# Patient Record
Sex: FEMALE | Race: WHITE | NOT HISPANIC OR LATINO | Employment: FULL TIME | URBAN - METROPOLITAN AREA
[De-identification: names, ages, dates, MRNs, and addresses within clinical notes are randomized per-mention and may not be internally consistent; named-entity substitution may affect disease eponyms.]

---

## 2022-03-14 ENCOUNTER — TELEPHONE (OUTPATIENT)
Dept: OBGYN CLINIC | Facility: HOSPITAL | Age: 52
End: 2022-03-14

## 2022-05-04 NOTE — PROGRESS NOTES
Assessment and Plan:   Ms Pattie Mojica is a 43-year-old female with no significant past medical history who presents for an evaluation of a positive JENN (no titer or pattern reported), an RNP antibody of 1 4, skin rash and myalgias  She is self-referred today  Anil Zeng presents today for an evaluation of a positive JENN and borderline positive RNP antibody but without prominent symptoms to suggest an underlying connective tissue disease  Her main concerns are widespread body aches which appears to be more representative of a myofascial pain syndrome although she does not have myofascial tenderness noted on her examination today  This sounds less suggestive of an inflammatory arthritis and I advised her that she can continue to manage this with a conservative approach such as with use of Tylenol, NSAIDs as needed or with dietary modification  Additionally she reports a recurrent macular skin rash which she has noticed most prominently on her neck  The appearance of this does not seem suggestive of an autoimmune type skin rash and I advised her if this continues to be a recurrent issue I would recommend she seek an opinion with Allergy/immunology or Dermatology  Otherwise her review of systems is unremarkable and at this time I suspect the JENN and RNP antibody are clinically insignificant  - I will further evaluate the positive JENN and her symptoms by obtaining the testing as listed below  I will contact her with the results and determine if she needs a sooner follow-up  If the workup is unrevealing then I will monitor the JENN and her symptoms on an annual basis  Plan:  Diagnoses and all orders for this visit:    Positive JENN (antinuclear antibody)  -     Anti-Jessica 1 Antibody; Future  -     Centromere Antibody; Future  -     Anti-scleroderma antibody; Future  -     Histone Antibody; Future  -     Nuclear antigen antibody; Future  -     Sedimentation rate, automated;  Future  -     C-reactive protein; Future  -     C3 complement; Future  -     C4 complement; Future  -     Beta-2 glycoprotein antibodies; Future  -     Cardiolipin antibody; Future  -     Lupus anticoagulant; Future  -     Protein / creatinine ratio, urine  -     Ferritin; Future  -     Thyroid Antibodies Panel; Future    Anti-RNP antibodies present    Diffuse arthralgia  -     RF Screen w/ Reflex to Titer; Future  -     Cyclic citrul peptide antibody, IgG; Future  -     Ferritin; Future  -     HLA-B27 antigen; Future    Skin rash  -     Ferritin; Future  -     HLA-B27 antigen; Future    Screening for thyroid disorder  -     TSH, 3rd generation; Future  -     Thyroid Antibodies Panel; Future    Other orders  -     ergocalciferol (ERGOCALCIFEROL) 1 25 MG (47427 UT) capsule; Take 50,000 Units by mouth every 7 days  -     methylPREDNISolone 4 MG tablet therapy pack; Follow package directions (Patient not taking: Reported on 5/5/2022 )  -     vitamin A 3 MG (83800 UT) capsule; Take 10,000 Units by mouth daily      Activities as tolerated  Exercise: try to maintain a low impact exercise regimen as much as possible  Walk for 30 minutes a day for at least 3 days a week  Continue other medications as prescribed by PCP and other specialists  RTC in 1 year  HPI  Ms Candelaria Church is a 60-year-old female with no significant past medical history who presents for an evaluation of a positive JENN (no titer or pattern reported), an RNP antibody of 1 4, skin rash and myalgias  She is self-referred today  Patient reports for more than 10 years she has experienced body aches and stiffness which she describes as a flu-like sensation without localized joint pains or swelling, for which she modified her diet to gluten free  She reports making this change helped significantly with her symptoms but on occasion she will experience what she describes as flare-ups of the body aches but is unsure if this may be related to her diet    She reports the symptoms are manageable but if they become severe she will take ibuprofen which helps  She mentions over the past 2 years she has had various skin rashes which are red and flat to the skin that have occurred on her neck, under her left eye and occasionally on her arms or thighs  This has occurred sporadically over the past few years but since January 2022 she was experiencing the neck rash on a near weekly basis  It has not occurred in the past 3 weeks  She would apply topical ointments including steroids which would not help and she reports the skin rash usually resolves spontaneously within a week  It is itchy  It is not photosensitive  She has been unable to associate this with any dietary triggers or environmental agents such as detergents, lotions, shampoos etc  She has not seen Allergy/immunology or Dermatology for this  She has noticed a nodularity which she thinks may be a lymph node in her right supraclavicular region which is fairly constant  She denies fevers, unintentional weight loss, alopecia, dry eyes, dry mouth, inflammatory eye disease, other types of skin rash, psoriasis (reports that she had this many years ago prior to becoming gluten free), skin thickening/tightening, photosensitivity, mouth/nose ulcers, dysphagia, GERD, pleuritic chest pain, shortness of breath, cough, inflammatory bowel disease, blood clots (reports a history of 1 miscarriage), Raynaud's or muscle weakness  She reports a history of fibromyalgia in her mother and states that in her 76s she was diagnosed with systemic lupus erythematosus  Of note she has seen Rheumatology in the past for the positive JENN and there was apparently no concerns for an autoimmune disease at that time      The following portions of the patient's history were reviewed and updated as appropriate: allergies, current medications, past family history, past medical history, past social history, past surgical history and problem list       Review of Systems  Constitutional: Negative for weight change, fevers, chills, night sweats, fatigue  ENT/Mouth: Negative for hearing changes, ear pain, nasal congestion, sinus pain, hoarseness, sore throat, rhinorrhea, swallowing difficulty  Eyes: Negative for pain, redness, discharge, vision changes  Cardiovascular: Negative for chest pain, SOB, palpitations  Respiratory: Negative for cough, sputum, wheezing, dyspnea  Gastrointestinal: Negative for nausea, vomiting, diarrhea, constipation, pain, heartburn  Genitourinary: Negative for dysuria, urinary frequency, hematuria  Musculoskeletal: As per HPI  Skin: Negative for color changes  Positive for skin rash  Neuro: Negative for weakness, numbness, tingling, loss of consciousness  Psych: Negative for anxiety, depression  Heme/Lymph: Negative for easy bruising, bleeding, lymphadenopathy  History reviewed  No pertinent past medical history        Past Surgical History:   Procedure Laterality Date    SHOULDER SURGERY         Social History     Socioeconomic History    Marital status: /Civil Union     Spouse name: Not on file    Number of children: Not on file    Years of education: Not on file    Highest education level: Not on file   Occupational History    Not on file   Tobacco Use    Smoking status: Never Smoker    Smokeless tobacco: Never Used   Substance and Sexual Activity    Alcohol use: Yes     Comment: social    Drug use: Never    Sexual activity: Not on file   Other Topics Concern    Not on file   Social History Narrative    Not on file     Social Determinants of Health     Financial Resource Strain: Not on file   Food Insecurity: Not on file   Transportation Needs: Not on file   Physical Activity: Not on file   Stress: Not on file   Social Connections: Not on file   Intimate Partner Violence: Not on file   Housing Stability: Not on file       Family History   Problem Relation Age of Onset    Lupus Mother    [facility] Fibromyalgia Mother     Cancer Father     Cancer Maternal Grandmother     Cancer Maternal Grandfather        Allergies   Allergen Reactions    Gluten Meal - Food Allergy Abdominal Pain, Dizziness, Fatigue, GI Intolerance, Headache, Hives, Lightheadedness and Vomiting    Penicillins Rash       Current Outpatient Medications:     vitamin A 3 MG (97734 UT) capsule, Take 10,000 Units by mouth daily, Disp: , Rfl:     ergocalciferol (ERGOCALCIFEROL) 1 25 MG (96896 UT) capsule, Take 50,000 Units by mouth every 7 days, Disp: , Rfl:     methylPREDNISolone 4 MG tablet therapy pack, Follow package directions (Patient not taking: Reported on 5/5/2022 ), Disp: , Rfl:       Objective:    Vitals:    05/05/22 0846   BP: 109/72   Pulse: 71   Temp: 98 4 °F (36 9 °C)   Weight: 65 8 kg (145 lb)       Physical Exam  General: Well appearing, well nourished, in no distress  Oriented x 3, normal mood and affect  Ambulating without difficulty  Skin: Good turgor, no rash on exam today, unusual bruising or prominent lesions  Hair: Normal texture and distribution  Nails: Normal color, no deformities  HEENT:  Head: Normocephalic, atraumatic  Eyes: Conjunctiva clear, sclera non-icteric, EOM intact  Nose: No external lesions, mucosa non-inflamed  Mouth: Mucous membranes moist, no mucosal lesions  Neck: Supple, thyroid non-enlarged and non-tender  No lymphadenopathy, and I think she has prominence of the right sternocleidomastoid muscle  Extremities: No amputations or deformities, cyanosis, edema  Musculoskeletal:   There is no peripheral joint soft tissue swelling or tenderness noted  She has cervical and lumbar myofascial tenderness noted but otherwise negative fibromyalgia tender points  Neurologic: Alert and oriented  No focal neurological deficits appreciated  Psychiatric: Normal mood and affect  NICK Vargas    Rheumatology

## 2022-05-05 ENCOUNTER — OFFICE VISIT (OUTPATIENT)
Dept: RHEUMATOLOGY | Facility: CLINIC | Age: 52
End: 2022-05-05
Payer: COMMERCIAL

## 2022-05-05 VITALS
SYSTOLIC BLOOD PRESSURE: 109 MMHG | TEMPERATURE: 98.4 F | WEIGHT: 145 LBS | DIASTOLIC BLOOD PRESSURE: 72 MMHG | HEART RATE: 71 BPM

## 2022-05-05 DIAGNOSIS — R76.8 POSITIVE ANA (ANTINUCLEAR ANTIBODY): Primary | ICD-10-CM

## 2022-05-05 DIAGNOSIS — Z13.29 SCREENING FOR THYROID DISORDER: ICD-10-CM

## 2022-05-05 DIAGNOSIS — M25.50 DIFFUSE ARTHRALGIA: ICD-10-CM

## 2022-05-05 DIAGNOSIS — R76.8 ANTI-RNP ANTIBODIES PRESENT: ICD-10-CM

## 2022-05-05 DIAGNOSIS — R21 SKIN RASH: ICD-10-CM

## 2022-05-05 PROCEDURE — 99205 OFFICE O/P NEW HI 60 MIN: CPT | Performed by: INTERNAL MEDICINE

## 2022-05-05 RX ORDER — ERGOCALCIFEROL (VITAMIN D2) 1250 MCG
50000 CAPSULE ORAL
COMMUNITY

## 2022-05-05 RX ORDER — METHYLPREDNISOLONE 4 MG/1
TABLET ORAL
COMMUNITY
Start: 2022-04-27

## 2025-03-12 ENCOUNTER — OFFICE VISIT (OUTPATIENT)
Dept: RHEUMATOLOGY | Facility: CLINIC | Age: 55
End: 2025-03-12
Payer: COMMERCIAL

## 2025-03-12 VITALS
WEIGHT: 141 LBS | DIASTOLIC BLOOD PRESSURE: 78 MMHG | HEART RATE: 62 BPM | OXYGEN SATURATION: 95 % | SYSTOLIC BLOOD PRESSURE: 110 MMHG | HEIGHT: 67 IN | BODY MASS INDEX: 22.13 KG/M2

## 2025-03-12 DIAGNOSIS — E04.1 THYROID CYST: Primary | ICD-10-CM

## 2025-03-12 DIAGNOSIS — R77.8 HIGH SERUM THYROGLOBULIN: ICD-10-CM

## 2025-03-12 PROCEDURE — 99213 OFFICE O/P EST LOW 20 MIN: CPT | Performed by: INTERNAL MEDICINE

## 2025-03-12 RX ORDER — ASPIRIN 81 MG/1
81 TABLET ORAL DAILY
COMMUNITY

## 2025-03-12 RX ORDER — AZITHROMYCIN 250 MG/1
TABLET, FILM COATED ORAL
COMMUNITY
Start: 2025-03-05

## 2025-03-12 RX ORDER — OMEGA-3/DHA/EPA/FISH OIL 300-1000MG
2 CAPSULE ORAL DAILY
COMMUNITY

## 2025-03-12 RX ORDER — PERFLUOROHEXYLOCTANE 1 MG/MG
SOLUTION OPHTHALMIC
COMMUNITY
Start: 2024-12-18

## 2025-03-12 RX ORDER — GABAPENTIN 100 MG/1
100 CAPSULE ORAL 2 TIMES DAILY
COMMUNITY

## 2025-03-12 RX ORDER — VALACYCLOVIR HYDROCHLORIDE 1 G/1
1 TABLET, FILM COATED ORAL DAILY
COMMUNITY
Start: 2025-01-30

## 2025-03-12 RX ORDER — TIZANIDINE HYDROCHLORIDE 2 MG/1
2 CAPSULE, GELATIN COATED ORAL
COMMUNITY

## 2025-03-12 NOTE — PROGRESS NOTES
Name: Nicole Abraham      : 1970      MRN: [id number]  Encounter Provider: Karen Rivera MD  Encounter Date: 3/12/2025   Encounter department: [facility] [place]  :  Assessment & Plan  Thyroid cyst  Pt has a new onset of mass on the R side of anterior neck that she noticed 1 week ago, blood work showed very elevated thyroglobulin levels >2000 but normal TSH, T3/4, and no detectable thyroid ab. She also had an ultrasound of thyroid showing benign cyst of the R side of thyroid. She said it is mildly tender on palpation.  Plan:  Recommend referral to Endocrinology for further evaluation and management  No further workup from our end, or need to follow with Rheumatology at this time  Orders:    Ambulatory Referral to Endocrinology; Future    High serum thyroglobulin    Orders:    Ambulatory Referral to Endocrinology; Future        History of Present Illness     Initial visit 2022  HPI  Ms. Abraham is a 52-year-old female with no significant past medical history who presents for an evaluation of a positive JENN (no titer or pattern reported), an RNP antibody of 1.4, skin rash and myalgias.  She is self-referred today.     Patient reports for more than 10 years she has experienced body aches and stiffness which she describes as a flu-like sensation without localized joint pains or swelling, for which she modified her diet to gluten free.  She reports making this change helped significantly with her symptoms but on occasion she will experience what she describes as flare-ups of the body aches but is unsure if this may be related to her diet.  She reports the symptoms are manageable but if they become severe she will take ibuprofen which helps.  She mentions over the past 2 years she has had various skin rashes which are red and flat to the skin that have occurred on her neck, under her left eye and occasionally on her arms or thighs.  This has occurred sporadically over the past few  years but since January 2022 she was experiencing the neck rash on a near weekly basis.  It has not occurred in the past 3 weeks.  She would apply topical ointments including steroids which would not help and she reports the skin rash usually resolves spontaneously within a week.  It is itchy.  It is not photosensitive.  She has been unable to associate this with any dietary triggers or environmental agents such as detergents, lotions, shampoos etc..  She has not seen Allergy/immunology or Dermatology for this.     She has noticed a nodularity which she thinks may be a lymph node in her right supraclavicular region which is fairly constant.  She denies fevers, unintentional weight loss, alopecia, dry eyes, dry mouth, inflammatory eye disease, other types of skin rash, psoriasis (reports that she had this many years ago prior to becoming gluten free), skin thickening/tightening, photosensitivity, mouth/nose ulcers, dysphagia, GERD, pleuritic chest pain, shortness of breath, cough, inflammatory bowel disease, blood clots (reports a history of 1 miscarriage), Raynaud's or muscle weakness.  She reports a history of fibromyalgia in her mother and states that in her 70s she was diagnosed with systemic lupus erythematosus.     Of note she has seen Rheumatology in the past for the positive JENN and there was apparently no concerns for an autoimmune disease at that time.     The following portions of the patient's history were reviewed and updated as appropriate: allergies, current medications, past family history, past medical history, past social history, past surgical history and problem list.    3/12/25  KUNAL Abraham is a 55 y.o. female who presents for evaluation of rashes and myalgias.    She had a positive JENN and RNP antibody but her symptoms did not seem to be consistent with a particular diagnosis. She did have myofascial pain that seemed to be more consistent with fibromyalgia than an inflammatory  "arthritis.    Blood work done since that visit showed + RNP ab 1.4, negative RF, CCP, HLAB27 ag, smith ab, dsdna ab, SSA/SSB, histone ab, centromere ab, scl-70 ab, anti-Jo1 ab, B2GP, lupus anticoagulant, cardiolipin ab, normal C3/C4, normal CRP and ESR    Since her last visit, she has not had any new symptoms of joint pain/swelling.    She is on gabapentin PRN due to having shingles in November 2023, and also had ocular involvement so has been taking Valtrex as well daily.     The only new symptom she has that she is coming today for evaluation of is a mass in her neck. She felt a lump in her neck 1 week ago and lab workup shows elevated thyroglobulin, and thyroid ultrasound showed a benign cyst. She was referred to us by her PCP to see if any autoimmune cause for this.     She has been having chronic back pain, following Spine doctors and was recommended to get surgery but she delayed and decided to do PT and see chiropractor which has been very helpful.             Review of Systems   Constitutional: Negative.    HENT:          Mass in the neck   Eyes: Negative.    Respiratory: Negative.     Cardiovascular: Negative.    Gastrointestinal: Negative.    Genitourinary: Negative.    Musculoskeletal: Negative.    Skin: Negative.           Objective   /78   Pulse 62   Ht 5' 7\" (1.702 m)   Wt 64 kg (141 lb)   SpO2 95%   BMI 22.08 kg/m²      Physical Exam  Constitutional:       Appearance: Normal appearance.   HENT:      Head: Normocephalic.   Eyes:      Extraocular Movements: Extraocular movements intact.      Pupils: Pupils are equal, round, and reactive to light.   Neck:      Comments: Right side of anterior neck has a mass, mildly tender to touch  Cardiovascular:      Pulses: Normal pulses.      Heart sounds: Normal heart sounds.   Pulmonary:      Effort: Pulmonary effort is normal.      Breath sounds: Normal breath sounds.   Musculoskeletal:      Comments: MSK Exam  The following areas have been examined " today and do not show any signs of synovitis, tenderness, or restricted ROM unless otherwise specified below:  Neck  Shoulders  Back  Elbows  Wrists  Hands  Hips  Knees  Ankles  Feet    Neurological:      Mental Status: She is alert and oriented to person, place, and time.

## 2025-03-12 NOTE — ASSESSMENT & PLAN NOTE
Pt has a new onset of mass on the R side of anterior neck that she noticed 1 week ago, blood work showed very elevated thyroglobulin levels >2000 but normal TSH, T3/4, and no detectable thyroid ab. She also had an ultrasound of thyroid showing benign cyst of the R side of thyroid. She said it is mildly tender on palpation.  Plan:  Recommend referral to Endocrinology for further evaluation and management  No further workup from our end, or need to follow with Rheumatology at this time  Orders:    Ambulatory Referral to Endocrinology; Future

## 2025-04-30 ENCOUNTER — CONSULT (OUTPATIENT)
Dept: ENDOCRINOLOGY | Facility: CLINIC | Age: 55
End: 2025-04-30
Payer: COMMERCIAL

## 2025-04-30 VITALS
HEART RATE: 66 BPM | DIASTOLIC BLOOD PRESSURE: 78 MMHG | OXYGEN SATURATION: 97 % | WEIGHT: 140 LBS | BODY MASS INDEX: 21.97 KG/M2 | SYSTOLIC BLOOD PRESSURE: 108 MMHG | TEMPERATURE: 98.3 F | HEIGHT: 67 IN | RESPIRATION RATE: 12 BRPM

## 2025-04-30 DIAGNOSIS — E83.52 HYPERCALCEMIA: ICD-10-CM

## 2025-04-30 DIAGNOSIS — R77.8 HIGH SERUM THYROGLOBULIN: Primary | ICD-10-CM

## 2025-04-30 DIAGNOSIS — E04.1 THYROID CYST: ICD-10-CM

## 2025-04-30 PROCEDURE — 99204 OFFICE O/P NEW MOD 45 MIN: CPT | Performed by: INTERNAL MEDICINE

## 2025-04-30 NOTE — ASSESSMENT & PLAN NOTE
She has a Rt midpole 1.8c, X 2.3 X 2.4cm mostly cystic lesion identified on US at an outside facility.    Today we discussed all aspects of thyroid nodules/cysts including prevalence, usual risk factors, pathophysiology, complications including cancer risk, role of US, biopsy, genetic testing and treatment options including hemithyroidectomy, recurrent imaging and labs.     She reports improved lesion after she massaged with castor oil for a few days.    We agreed to repeat US thyroid. Clinically the lesion is not identifiable today.

## 2025-04-30 NOTE — PROGRESS NOTES
"    New Consult Note      CC: thyroid dysfunction    History of Present Illness:   55 yr female with Hx PFO, mild hypercalcemia, Thyroid cyst and hyper-thyroglobulinemia.     She noticed a rt thyroid lobe with rare change in voice and difficulty swallowing but no other symptoms. No hyperthyroid symptoms.    Menopause: age 50    She is otherwise asymptomatic.      Physical Exam:  Body mass index is 21.93 kg/m².  Wt 63.5 kg (140 lb)   BMI 21.93 kg/m²    Vitals:    04/30/25 1306   Weight: 63.5 kg (140 lb)        Physical Exam  Constitutional:       General: She is not in acute distress.     Appearance: She is well-developed.   HENT:      Head: Normocephalic and atraumatic.      Nose: Nose normal.   Eyes:      Conjunctiva/sclera: Conjunctivae normal.   Pulmonary:      Effort: Pulmonary effort is normal.   Abdominal:      General: There is no distension.   Musculoskeletal:      Cervical back: Normal range of motion and neck supple.   Skin:     Findings: No rash.      Comments: No icterus   Neurological:      Mental Status: She is alert and oriented to person, place, and time.         Labs:   No results found for: \"HGBA1C\"    No results found for: \"YTL8IUCYHONC\", \"TSH\", \"G6DNWKZ\", \"B4KIMYE\", \"THYROIDAB\"    No results found for: \"CREATININE\", \"BUN\", \"NA\", \"K\", \"CL\", \"CO2\"  No results found for: \"EGFR\"    No results found for: \"ALT\", \"AST\", \"GGT\", \"ALKPHOS\", \"BILITOT\"    No results found for: \"CHOLESTEROL\"  No results found for: \"HDL\"  No results found for: \"TRIG\"  No results found for: \"NONHDLC\"      Assessment/Plan:    1. High serum thyroglobulin  Assessment & Plan:  Unclear etiology but levels are much higher than normal. R/o lab error as this is JARROD assay.    We discussed normal thyroid physiology and pathophysiology. Reassured about Thyroglobulin testing in patients post thyroidectomy vs normal thyroid.    There is a rare possibility of thyroglobulin production from an extra-thyroid source like a struma Ovarii but she " is post menopausal and she does not have hyperthyroidism.  We agreed to repeat labs as listed.  We may consider a I123 uptake scan in case rpeat labs come back with severely elevated Tg levels but I don't think she will need it.    Follow up in 3 months.  Orders:  -     Ambulatory Referral to Endocrinology  -     Thyroid Antibodies Panel; Future  -     T4, free; Future  -     T3; Future  -     TSH, 3rd generation; Future  -     Thyroglobulin, Quant w/ AB ([facility]); Future; Expected date: 05/07/2025  2. Thyroid cyst  Assessment & Plan:  She has a Rt midpole 1.8c, X 2.3 X 2.4cm mostly cystic lesion identified on US at an outside facility.    Today we discussed all aspects of thyroid nodules/cysts including prevalence, usual risk factors, pathophysiology, complications including cancer risk, role of US, biopsy, genetic testing and treatment options including hemithyroidectomy, recurrent imaging and labs.     She reports improved lesion after she massaged with castor oil for a few days.    We agreed to repeat US thyroid. Clinically the lesion is not identifiable today.    Orders:  -     Ambulatory Referral to Endocrinology  -     Thyroid Antibodies Panel; Future  -     T4, free; Future  -     T3; Future  -     TSH, 3rd generation; Future  -     Thyroglobulin, Quant w/ AB ([facility]); Future; Expected date: 05/07/2025  -     US thyroid; Future; Expected date: 04/30/2025  3. Hypercalcemia  Assessment & Plan:  She had mild hypercalcemia with recent labs.  Will r/o hyperparathyroidism.  Orders:  -     Vitamin D 25 hydroxy; Future  -     PTH, intact; Future  -     Phosphorus; Future  -     Calcium, ionized; Future  -     Comprehensive metabolic panel; Future        I have spent a total time of 41 minutes on 04/30/25 in caring for this patient including greater than 50% of this time was spent in counseling/coordination of care as listed above.       Discussed with the patient and all questioned fully answered. She will contact me  with concerns.    Radha Stephenson

## 2025-04-30 NOTE — ASSESSMENT & PLAN NOTE
Unclear etiology but levels are much higher than normal. R/o lab error as this is JARROD assay.    We discussed normal thyroid physiology and pathophysiology. Reassured about Thyroglobulin testing in patients post thyroidectomy vs normal thyroid.    There is a rare possibility of thyroglobulin production from an extra-thyroid source like a struma Ovarii but she is post menopausal and she does not have hyperthyroidism.  We agreed to repeat labs as listed.  We may consider a I123 uptake scan in case rpeat labs come back with severely elevated Tg levels but I don't think she will need it.    Follow up in 3 months.